# Patient Record
Sex: FEMALE | Race: BLACK OR AFRICAN AMERICAN | URBAN - METROPOLITAN AREA
[De-identification: names, ages, dates, MRNs, and addresses within clinical notes are randomized per-mention and may not be internally consistent; named-entity substitution may affect disease eponyms.]

---

## 2017-10-25 ENCOUNTER — OFFICE VISIT (OUTPATIENT)
Dept: PRIMARY CARE CLINIC | Age: 32
End: 2017-10-25

## 2017-10-25 VITALS
SYSTOLIC BLOOD PRESSURE: 106 MMHG | TEMPERATURE: 97.7 F | WEIGHT: 175.9 LBS | DIASTOLIC BLOOD PRESSURE: 66 MMHG | OXYGEN SATURATION: 99 % | HEIGHT: 68 IN | HEART RATE: 74 BPM | BODY MASS INDEX: 26.66 KG/M2

## 2017-10-25 DIAGNOSIS — Z12.4 CERVICAL CANCER SCREENING: ICD-10-CM

## 2017-10-25 DIAGNOSIS — Z00.00 PREVENTATIVE HEALTH CARE: Primary | ICD-10-CM

## 2017-10-25 DIAGNOSIS — Z23 NEED FOR TDAP VACCINATION: ICD-10-CM

## 2017-10-25 DIAGNOSIS — Z00.00 PREVENTATIVE HEALTH CARE: ICD-10-CM

## 2017-10-25 PROBLEM — S92.511A CLOSED DISPLACED FRACTURE OF PROXIMAL PHALANX OF LESSER TOE OF RIGHT FOOT: Status: ACTIVE | Noted: 2017-10-14

## 2017-10-25 LAB
ALBUMIN SERPL-MCNC: 4.2 G/DL (ref 3.4–5)
ALP BLD-CCNC: 40 U/L (ref 40–129)
ALT SERPL-CCNC: 6 U/L (ref 10–40)
ANION GAP SERPL CALCULATED.3IONS-SCNC: 12 MMOL/L (ref 3–16)
AST SERPL-CCNC: 12 U/L (ref 15–37)
BASOPHILS ABSOLUTE: 0 K/UL (ref 0–0.2)
BASOPHILS RELATIVE PERCENT: 0.2 %
BILIRUB SERPL-MCNC: 0.8 MG/DL (ref 0–1)
BILIRUBIN DIRECT: <0.2 MG/DL (ref 0–0.3)
BILIRUBIN, INDIRECT: ABNORMAL MG/DL (ref 0–1)
BUN BLDV-MCNC: 12 MG/DL (ref 7–20)
CALCIUM SERPL-MCNC: 9.4 MG/DL (ref 8.3–10.6)
CHLORIDE BLD-SCNC: 103 MMOL/L (ref 99–110)
CO2: 26 MMOL/L (ref 21–32)
CREAT SERPL-MCNC: 0.8 MG/DL (ref 0.6–1.1)
EOSINOPHILS ABSOLUTE: 0.1 K/UL (ref 0–0.6)
EOSINOPHILS RELATIVE PERCENT: 0.7 %
GFR AFRICAN AMERICAN: >60
GFR NON-AFRICAN AMERICAN: >60
GLUCOSE BLD-MCNC: 72 MG/DL (ref 70–99)
HCT VFR BLD CALC: 41 % (ref 36–48)
HEMOGLOBIN: 13.9 G/DL (ref 12–16)
HEPATITIS C ANTIBODY INTERPRETATION: NORMAL
LYMPHOCYTES ABSOLUTE: 1.3 K/UL (ref 1–5.1)
LYMPHOCYTES RELATIVE PERCENT: 17 %
MCH RBC QN AUTO: 30.6 PG (ref 26–34)
MCHC RBC AUTO-ENTMCNC: 33.9 G/DL (ref 31–36)
MCV RBC AUTO: 90.1 FL (ref 80–100)
MONOCYTES ABSOLUTE: 0.6 K/UL (ref 0–1.3)
MONOCYTES RELATIVE PERCENT: 7.4 %
NEUTROPHILS ABSOLUTE: 5.8 K/UL (ref 1.7–7.7)
NEUTROPHILS RELATIVE PERCENT: 74.7 %
PDW BLD-RTO: 13.5 % (ref 12.4–15.4)
PHOSPHORUS: 3.8 MG/DL (ref 2.5–4.9)
PLATELET # BLD: 274 K/UL (ref 135–450)
PMV BLD AUTO: 8.5 FL (ref 5–10.5)
POTASSIUM SERPL-SCNC: 4.2 MMOL/L (ref 3.5–5.1)
RBC # BLD: 4.55 M/UL (ref 4–5.2)
SODIUM BLD-SCNC: 141 MMOL/L (ref 136–145)
TOTAL PROTEIN: 7 G/DL (ref 6.4–8.2)
TSH REFLEX: 2.94 UIU/ML (ref 0.27–4.2)
WBC # BLD: 7.8 K/UL (ref 4–11)

## 2017-10-25 PROCEDURE — 90715 TDAP VACCINE 7 YRS/> IM: CPT | Performed by: INTERNAL MEDICINE

## 2017-10-25 PROCEDURE — 99385 PREV VISIT NEW AGE 18-39: CPT | Performed by: INTERNAL MEDICINE

## 2017-10-25 PROCEDURE — 90471 IMMUNIZATION ADMIN: CPT | Performed by: INTERNAL MEDICINE

## 2017-10-25 ASSESSMENT — ENCOUNTER SYMPTOMS
CONSTIPATION: 0
COUGH: 0
NAUSEA: 0
ABDOMINAL PAIN: 0
DIARRHEA: 0
SHORTNESS OF BREATH: 0

## 2017-10-25 ASSESSMENT — PATIENT HEALTH QUESTIONNAIRE - PHQ9
SUM OF ALL RESPONSES TO PHQ9 QUESTIONS 1 & 2: 0
SUM OF ALL RESPONSES TO PHQ QUESTIONS 1-9: 0
2. FEELING DOWN, DEPRESSED OR HOPELESS: 0
1. LITTLE INTEREST OR PLEASURE IN DOING THINGS: 0

## 2017-10-25 NOTE — PROGRESS NOTES
After obtaining consent, and per orders of Dr. Zach Constantino, injection of tdap vaccine given in left arm by Violet Koenig. Patient instructed to remain in clinic for 20 minutes afterwards, and to report any adverse reaction to me immediately.

## 2017-10-25 NOTE — PATIENT INSTRUCTIONS
Patient Education        Well Visit, Ages 25 to 48: Care Instructions  Your Care Instructions  Physical exams can help you stay healthy. Your doctor has checked your overall health and may have suggested ways to take good care of yourself. He or she also may have recommended tests. At home, you can help prevent illness with healthy eating, regular exercise, and other steps. Follow-up care is a key part of your treatment and safety. Be sure to make and go to all appointments, and call your doctor if you are having problems. It's also a good idea to know your test results and keep a list of the medicines you take. How can you care for yourself at home? · Reach and stay at a healthy weight. This will lower your risk for many problems, such as obesity, diabetes, heart disease, and high blood pressure. · Get at least 30 minutes of physical activity on most days of the week. Walking is a good choice. You also may want to do other activities, such as running, swimming, cycling, or playing tennis or team sports. Discuss any changes in your exercise program with your doctor. · Do not smoke or allow others to smoke around you. If you need help quitting, talk to your doctor about stop-smoking programs and medicines. These can increase your chances of quitting for good. · Talk to your doctor about whether you have any risk factors for sexually transmitted infections (STIs). Having one sex partner (who does not have STIs and does not have sex with anyone else) is a good way to avoid these infections. · Use birth control if you do not want to have children at this time. Talk with your doctor about the choices available and what might be best for you. · Protect your skin from too much sun. When you're outdoors from 10 a.m. to 4 p.m., stay in the shade or cover up with clothing and a hat with a wide brim. Wear sunglasses that block UV rays.  Even when it's cloudy, put broad-spectrum sunscreen (SPF 30 or higher) on any

## 2017-10-26 LAB
ESTIMATED AVERAGE GLUCOSE: 105.4 MG/DL
HBA1C MFR BLD: 5.3 %
HIV-1 AND HIV-2 ANTIBODIES: NORMAL

## 2020-11-20 ENCOUNTER — HOSPITAL ENCOUNTER (EMERGENCY)
Age: 35
Discharge: HOME | End: 2020-11-20
Attending: EMERGENCY MEDICINE
Payer: MEDICARE

## 2020-11-20 VITALS
HEART RATE: 86 BPM | TEMPERATURE: 98.6 F | WEIGHT: 180.56 LBS | OXYGEN SATURATION: 100 % | BODY MASS INDEX: 27.36 KG/M2 | SYSTOLIC BLOOD PRESSURE: 112 MMHG | HEIGHT: 68 IN | RESPIRATION RATE: 17 BRPM | DIASTOLIC BLOOD PRESSURE: 77 MMHG

## 2020-11-20 PROCEDURE — 99283 EMERGENCY DEPT VISIT LOW MDM: CPT

## 2020-11-20 PROCEDURE — 93005 ELECTROCARDIOGRAM TRACING: CPT | Performed by: EMERGENCY MEDICINE

## 2020-11-20 RX ORDER — BENZONATATE 100 MG/1
100 CAPSULE ORAL 3 TIMES DAILY PRN
Qty: 30 CAPSULE | Refills: 0 | Status: SHIPPED | OUTPATIENT
Start: 2020-11-20 | End: 2020-11-27

## 2020-11-20 RX ORDER — IBUPROFEN 400 MG/1
400 TABLET ORAL EVERY 6 HOURS PRN
Qty: 30 TABLET | Refills: 0 | Status: SHIPPED | OUTPATIENT
Start: 2020-11-20

## 2020-11-20 ASSESSMENT — PAIN DESCRIPTION - DESCRIPTORS
DESCRIPTORS: HEADACHE
DESCRIPTORS: NAGGING

## 2020-11-20 ASSESSMENT — PAIN SCALES - GENERAL
PAINLEVEL_OUTOF10: 8
PAINLEVEL_OUTOF10: 8

## 2020-11-20 ASSESSMENT — PAIN DESCRIPTION - ONSET
ONSET: GRADUAL
ONSET: GRADUAL

## 2020-11-20 ASSESSMENT — PAIN DESCRIPTION - ORIENTATION
ORIENTATION: ANTERIOR
ORIENTATION: ANTERIOR

## 2020-11-20 ASSESSMENT — PAIN DESCRIPTION - LOCATION
LOCATION: HEAD;GENERALIZED
LOCATION: HEAD;GENERALIZED

## 2020-11-20 ASSESSMENT — PAIN DESCRIPTION - FREQUENCY
FREQUENCY: CONTINUOUS
FREQUENCY: CONTINUOUS

## 2020-11-20 ASSESSMENT — PAIN DESCRIPTION - PAIN TYPE
TYPE: ACUTE PAIN
TYPE: ACUTE PAIN

## 2020-11-20 ASSESSMENT — PAIN - FUNCTIONAL ASSESSMENT: PAIN_FUNCTIONAL_ASSESSMENT: 0-10

## 2020-11-20 ASSESSMENT — PAIN DESCRIPTION - PROGRESSION
CLINICAL_PROGRESSION: GRADUALLY WORSENING
CLINICAL_PROGRESSION: NOT CHANGED

## 2020-11-20 NOTE — ED NOTES
Discharge and education instructions reviewed. Patient verbalized understanding, teach-back successful. Patient denied questions at this time. No acute distress noted. Patient instructed to follow-up as noted - return to emergency department if symptoms worsen. Patient verbalized understanding. Discharged per EDMD with discharge instructions.         Jadon Gibbs RN  11/20/20 2063

## 2020-11-20 NOTE — ED PROVIDER NOTES
Emergency Physician Note    Chief Complaint  Concern For COVID-19 (symptoms started on Monday. pt states she was tested on tues. states her results are not back yet. pain 8/10. Symptoms: body aches, diarrhea, headache, loss of taste and smell )       History of Present Illness  Kyung Patel is a 28 y.o. female who presents to the ED for URI symptoms. Patient reports that she lost her sense of taste and smell and has some nasal congestion and diarrhea and headache. She was tested for COVID-19 couple days ago but does not have the results yet. She denies any fevers or shortness of breath but has had some episodes of chest pain that she wanted to have evaluated. She denies all cardiac or PE risk factors. She does not currently have any chest pain. 10 systems reviewed, pertinent positives per HPI otherwise noted to be negative    I have reviewed the following from the nursing documentation:      Prior to Admission medications    Not on File       Allergies as of 11/20/2020    (No Known Allergies)       History reviewed. No pertinent past medical history. Surgical History: History reviewed. No pertinent surgical history. Family History:  History reviewed. No pertinent family history.     Social History     Socioeconomic History    Marital status: Single     Spouse name: Not on file    Number of children: Not on file    Years of education: Not on file    Highest education level: Not on file   Occupational History    Not on file   Social Needs    Financial resource strain: Not on file    Food insecurity     Worry: Not on file     Inability: Not on file    Transportation needs     Medical: Not on file     Non-medical: Not on file   Tobacco Use    Smoking status: Never Smoker    Smokeless tobacco: Never Used   Substance and Sexual Activity    Alcohol use: No    Drug use: Not on file    Sexual activity: Yes   Lifestyle    Physical activity     Days per week: Not on file     Minutes per session: Not on file    Stress: Not on file   Relationships    Social connections     Talks on phone: Not on file     Gets together: Not on file     Attends Taoism service: Not on file     Active member of club or organization: Not on file     Attends meetings of clubs or organizations: Not on file     Relationship status: Not on file    Intimate partner violence     Fear of current or ex partner: Not on file     Emotionally abused: Not on file     Physically abused: Not on file     Forced sexual activity: Not on file   Other Topics Concern    Not on file   Social History Narrative    Not on file       Nursing notes reviewed. ED Triage Vitals [11/20/20 1010]   Enc Vitals Group      /77      Pulse 86      Resp 17      Temp 98.6 °F (37 °C)      Temp Source Infrared      SpO2 100 %      Weight 180 lb 8.9 oz (81.9 kg)      Height 5' 8\" (1.727 m)      Head Circumference       Peak Flow       Pain Score       Pain Loc       Pain Edu? Excl. in [address]? GENERAL:  Awake, alert. Well developed, well nourished with no apparent distress. HENT:  Normocephalic, Atraumatic, moist mucous membranes. EYES:  Pupils equal round and reactive to light, Conjunctiva normal, extraocular movements normal.  NECK:  No meningeal signs, Supple. CHEST:  Regular rate and rhythm, chest wall non-tender. LUNGS:  Clear to auscultation bilaterally. ABDOMEN:  Soft, non-tender, no rebound, rigidity or guarding, non-distended, normal bowel sounds. No costovertebral angle tenderness to palpation. BACK:  No tenderness. EXTREMITIES:  Normal range of motion, no edema, no bony tenderness, no deformity, distal pulses present. SKIN: Warm, dry and intact. NEUROLOGIC: Normal mental status. Moving all extremities to command. LABS and DIAGNOSTIC RESULTS  EKG  The Ekg interpreted by me shows  normal sinus rhythm with a rate of 72  Axis is   Normal  QTc is  normal  Intervals and Durations are unremarkable.       ST Segments: normal  Delta waves, Brugada Syndrome, and Short IA are not present. No prior EKG available for comparison. MEDICAL DECISION MAKING     I advised the patient to return to the emergency department immediately for any new or worsening symptoms, such as fever, shortness of breath or worsened chest pain. .   The patient voiced agreement and understanding of the treatment plan. No results found for this visit on 11/20/20. I estimate there is LOW risk for PULMONARY EMBOLISM, ACUTE CORONARY SYNDROME, OR THORACIC AORTIC DISSECTION, EPIGLOTTITIS, PNEUMONIA, MENINGITIS, OR URINARY TRACT INFECTION, thus I consider the discharge disposition reasonable. Also, there is no evidence or peritonitis, sepsis, or toxicity. [facility] and I have discussed the diagnosis and risks, and we agree with discharging home to follow-up with their primary doctor. We also discussed returning to the Emergency Department immediately if new or worsening symptoms occur. We have discussed the symptoms which are most concerning (e.g., changing or worsening pain, trouble swallowing or breating, neck stiffness, fever) that necessitate immediate return. Final Impression    1. Viral URI    2. Chest pain, unspecified type        Discharge Vital Signs:  Blood pressure 112/77, pulse 86, temperature 98.6 °F (37 °C), temperature source Infrared, resp. rate 17, height 5' 8\" (1.727 m), weight 180 lb 8.9 oz (81.9 kg), SpO2 100 %, not currently breastfeeding. Patient was given scripts for the following medications. I counseled patient how to take these medications.   Discharge Medication List as of 11/20/2020 11:22 AM      START taking these medications    Details   benzonatate (TESSALON PERLES) 100 MG capsule Take 1 capsule by mouth 3 times daily as needed for Cough, Disp-30 capsule,R-0Print      ibuprofen (ADVIL;MOTRIN) 400 MG tablet Take 1 tablet by mouth every 6 hours as needed for Pain, Disp-30 tablet,R-0Print Disposition  Pt is in good condition upon Discharge to home. This chart was generated using the [address] [place] [address] dictation system. I created this record but it may contain dictation errors.           Karan Fitzpatrick MD  11/20/20 5920

## 2020-11-21 LAB
EKG ATRIAL RATE: 72 BPM
EKG DIAGNOSIS: NORMAL
EKG P AXIS: 32 DEGREES
EKG P-R INTERVAL: 160 MS
EKG Q-T INTERVAL: 374 MS
EKG QRS DURATION: 72 MS
EKG QTC CALCULATION (BAZETT): 409 MS
EKG R AXIS: 26 DEGREES
EKG T AXIS: 28 DEGREES
EKG VENTRICULAR RATE: 72 BPM

## 2020-11-21 PROCEDURE — 93010 ELECTROCARDIOGRAM REPORT: CPT | Performed by: INTERNAL MEDICINE

## 2021-03-16 ENCOUNTER — NURSE TRIAGE (OUTPATIENT)
Dept: OTHER | Facility: CLINIC | Age: 36
End: 2021-03-16

## 2021-03-16 NOTE — TELEPHONE ENCOUNTER
Patient called Ross Lepe at [facility])  with red flag complaint. Brief description of triage: Tingling in both legs, stated in September, 2020. But in the last 3-4 months it has been all the time. It makes it difficult to sleep. Thighs especially but entire legs tingling. No back pain or problems. Had covid in November but didn't have chest pain until then. Chest pain since then off and on. Has not seen anyone about the chest pain issue. Covid symptoms at the time of diagnosis were severe headache and chest pain. Triage indicates for patient to see patient within the next 3 days     Care advice provided, patient verbalizes understanding; denies any other questions or concerns; instructed to call back for any new or worsening symptoms. Writer provided warm transfer to Elisa Ortega at [facility], [place], operated by [facility] for appointment scheduling. Attention Provider: Thank you for allowing me to participate in the care of your patient. The patient was connected to triage in response to information provided to the ECC. Please do not respond through this encounter as the response is not directed to a shared pool. Reason for Disposition   [1] Numbness or tingling in one or both feet AND [2] is a chronic symptom (recurrent or ongoing AND present > 4 weeks)    Answer Assessment - Initial Assessment Questions  1. SYMPTOM: \"What is the main symptom you are concerned about? \" (e.g., weakness, numbness)      Tingling in legs  2. ONSET: \"When did this start? \" (minutes, hours, days; while sleeping)      Sept. 2020  3. LAST NORMAL: \"When was the last time you were normal (no symptoms)? \"      It was gradual but then it started all the time. 4. PATTERN \"Does this come and go, or has it been constant since it started? \"  \"Is it present now? \"      Constant now. 5. CARDIAC SYMPTOMS: \"Have you had any of the following symptoms: chest pain, difficulty breathing, palpitations? \"      Chest pain since she had Covid in

## 2022-01-20 ENCOUNTER — OFFICE VISIT (OUTPATIENT)
Dept: PRIMARY CARE CLINIC | Age: 37
End: 2022-01-20
Payer: MEDICARE

## 2022-01-20 DIAGNOSIS — R68.83 CHILLS: ICD-10-CM

## 2022-01-20 DIAGNOSIS — R11.2 INTRACTABLE VOMITING WITH NAUSEA, UNSPECIFIED VOMITING TYPE: ICD-10-CM

## 2022-01-20 DIAGNOSIS — R53.83 FATIGUE, UNSPECIFIED TYPE: ICD-10-CM

## 2022-01-20 DIAGNOSIS — J34.89 RHINORRHEA: ICD-10-CM

## 2022-01-20 DIAGNOSIS — R51.9 ACUTE INTRACTABLE HEADACHE, UNSPECIFIED HEADACHE TYPE: ICD-10-CM

## 2022-01-20 DIAGNOSIS — Z20.822 CLOSE EXPOSURE TO COVID-19 VIRUS: Primary | ICD-10-CM

## 2022-01-20 DIAGNOSIS — R19.7 DIARRHEA, UNSPECIFIED TYPE: ICD-10-CM

## 2022-01-20 LAB — SARS-COV-2: NOT DETECTED

## 2022-01-20 PROCEDURE — G8484 FLU IMMUNIZE NO ADMIN: HCPCS | Performed by: NURSE PRACTITIONER

## 2022-01-20 PROCEDURE — G8428 CUR MEDS NOT DOCUMENT: HCPCS | Performed by: NURSE PRACTITIONER

## 2022-01-20 PROCEDURE — 99202 OFFICE O/P NEW SF 15 MIN: CPT | Performed by: NURSE PRACTITIONER

## 2022-01-20 PROCEDURE — 1036F TOBACCO NON-USER: CPT | Performed by: NURSE PRACTITIONER

## 2022-01-20 PROCEDURE — G8421 BMI NOT CALCULATED: HCPCS | Performed by: NURSE PRACTITIONER

## 2022-01-20 ASSESSMENT — ENCOUNTER SYMPTOMS
NAUSEA: 1
VISUAL CHANGE: 0
ABDOMINAL PAIN: 0
CHANGE IN BOWEL HABIT: 1
SORE THROAT: 0
COUGH: 0
VOMITING: 1
SWOLLEN GLANDS: 0

## 2022-01-20 NOTE — PROGRESS NOTES
Lamont Rush (:  1985) is a 39 y.o. female,New patient, here for evaluation of the following chief complaint(s):  No chief complaint on file. ASSESSMENT/PLAN:  1. Close exposure to COVID-19 virus  -     COVID-19  2. Chills  -     COVID-19  3. Fatigue, unspecified type  -     COVID-19  4. Rhinorrhea  -     COVID-19  5. Acute intractable headache, unspecified headache type  -     COVID-19  6. Intractable vomiting with nausea, unspecified vomiting type  -     COVID-19  7. Diarrhea, unspecified type  -     COVID-19      No follow-ups on file. Subjective   SUBJECTIVE/OBJECTIVE:  Car side visit  Exposure to covid positive case  She has chills, fatigue, runny nose, headache, nausea, vomiting and diarrhea for 2-3 days  She has had the covid vaccine times 2    Fatigue  This is a new problem. The current episode started in the past 7 days. The problem occurs constantly. The problem has been unchanged. Associated symptoms include a change in bowel habit, chills, fatigue, headaches, nausea and vomiting. Pertinent negatives include no abdominal pain, anorexia, arthralgias, chest pain, congestion, coughing, diaphoresis, fever, joint swelling, myalgias, neck pain, numbness, rash, sore throat, swollen glands, urinary symptoms, vertigo, visual change or weakness. Review of Systems   Constitutional: Positive for activity change, appetite change, chills and fatigue. Negative for diaphoresis and fever. HENT: Negative for congestion and sore throat. Respiratory: Negative for cough. Cardiovascular: Negative for chest pain. Gastrointestinal: Positive for change in bowel habit, nausea and vomiting. Negative for abdominal pain and anorexia. Musculoskeletal: Negative for arthralgias, joint swelling, myalgias and neck pain. Skin: Negative for rash. Neurological: Positive for headaches. Negative for vertigo, weakness and numbness.           Objective   Physical Exam  Constitutional: Appearance: She is ill-appearing. HENT:      Head: Normocephalic. Right Ear: External ear normal.      Left Ear: External ear normal.      Nose: Nose normal.      Mouth/Throat:      Mouth: Mucous membranes are moist.   Eyes:      Extraocular Movements: Extraocular movements intact. Conjunctiva/sclera: Conjunctivae normal.      Pupils: Pupils are equal, round, and reactive to light. Pulmonary:      Effort: Pulmonary effort is normal.   Musculoskeletal:         General: Normal range of motion. Cervical back: Normal range of motion. Skin:     General: Skin is warm. Neurological:      General: No focal deficit present. Mental Status: She is alert and oriented to person, place, and time. An electronic signature was used to authenticate this note.     --Merline Cue, APRN - CNP

## 2022-01-28 ENCOUNTER — OFFICE VISIT (OUTPATIENT)
Dept: PRIMARY CARE CLINIC | Age: 37
End: 2022-01-28
Payer: MEDICARE

## 2022-01-28 VITALS
OXYGEN SATURATION: 99 % | RESPIRATION RATE: 18 BRPM | SYSTOLIC BLOOD PRESSURE: 101 MMHG | BODY MASS INDEX: 28.49 KG/M2 | WEIGHT: 188 LBS | HEIGHT: 68 IN | HEART RATE: 70 BPM | DIASTOLIC BLOOD PRESSURE: 67 MMHG | TEMPERATURE: 97 F

## 2022-01-28 DIAGNOSIS — R19.7 DIARRHEA, UNSPECIFIED TYPE: ICD-10-CM

## 2022-01-28 DIAGNOSIS — R51.9 NONINTRACTABLE EPISODIC HEADACHE, UNSPECIFIED HEADACHE TYPE: Primary | ICD-10-CM

## 2022-01-28 DIAGNOSIS — R06.83 SNORING: ICD-10-CM

## 2022-01-28 DIAGNOSIS — Z00.00 ROUTINE ADULT HEALTH MAINTENANCE: ICD-10-CM

## 2022-01-28 DIAGNOSIS — R07.89 ATYPICAL CHEST PAIN: ICD-10-CM

## 2022-01-28 DIAGNOSIS — R42 LIGHTHEADED: ICD-10-CM

## 2022-01-28 DIAGNOSIS — R10.811 RIGHT UPPER QUADRANT ABDOMINAL TENDERNESS WITHOUT REBOUND TENDERNESS: ICD-10-CM

## 2022-01-28 PROCEDURE — 99203 OFFICE O/P NEW LOW 30 MIN: CPT | Performed by: INTERNAL MEDICINE

## 2022-01-28 PROCEDURE — 1036F TOBACCO NON-USER: CPT | Performed by: INTERNAL MEDICINE

## 2022-01-28 PROCEDURE — G8427 DOCREV CUR MEDS BY ELIG CLIN: HCPCS | Performed by: INTERNAL MEDICINE

## 2022-01-28 PROCEDURE — G8484 FLU IMMUNIZE NO ADMIN: HCPCS | Performed by: INTERNAL MEDICINE

## 2022-01-28 PROCEDURE — G8419 CALC BMI OUT NRM PARAM NOF/U: HCPCS | Performed by: INTERNAL MEDICINE

## 2022-01-28 RX ORDER — MELOXICAM 15 MG/1
15 TABLET ORAL DAILY PRN
Qty: 10 TABLET | Refills: 3 | Status: SHIPPED | OUTPATIENT
Start: 2022-01-28

## 2022-01-28 RX ORDER — BUSPIRONE HYDROCHLORIDE 5 MG/1
1 TABLET ORAL 2 TIMES DAILY
COMMUNITY
Start: 2021-12-14

## 2022-01-28 RX ORDER — PANTOPRAZOLE SODIUM 20 MG/1
20 TABLET, DELAYED RELEASE ORAL DAILY
Qty: 30 TABLET | Refills: 3 | Status: SHIPPED | OUTPATIENT
Start: 2022-01-28 | End: 2022-02-07 | Stop reason: SDUPTHER

## 2022-01-28 SDOH — ECONOMIC STABILITY: FOOD INSECURITY: WITHIN THE PAST 12 MONTHS, YOU WORRIED THAT YOUR FOOD WOULD RUN OUT BEFORE YOU GOT MONEY TO BUY MORE.: NEVER TRUE

## 2022-01-28 SDOH — ECONOMIC STABILITY: FOOD INSECURITY: WITHIN THE PAST 12 MONTHS, THE FOOD YOU BOUGHT JUST DIDN'T LAST AND YOU DIDN'T HAVE MONEY TO GET MORE.: NEVER TRUE

## 2022-01-28 ASSESSMENT — PATIENT HEALTH QUESTIONNAIRE - PHQ9
1. LITTLE INTEREST OR PLEASURE IN DOING THINGS: 0
SUM OF ALL RESPONSES TO PHQ QUESTIONS 1-9: 0
2. FEELING DOWN, DEPRESSED OR HOPELESS: 0
SUM OF ALL RESPONSES TO PHQ9 QUESTIONS 1 & 2: 0
SUM OF ALL RESPONSES TO PHQ QUESTIONS 1-9: 0

## 2022-01-28 ASSESSMENT — SOCIAL DETERMINANTS OF HEALTH (SDOH): HOW HARD IS IT FOR YOU TO PAY FOR THE VERY BASICS LIKE FOOD, HOUSING, MEDICAL CARE, AND HEATING?: NOT HARD AT ALL

## 2022-01-28 NOTE — PATIENT INSTRUCTIONS
1. Mobic 15mg daily as needed for headache  2. Protonix 20mg daily in AM before breakfast  3. Metamucil every evening in a glass of water, purchase OTC  4. Fasting blood work. 5. Stool test.  6. Abdl ultrasound. 7. See me after tsts  8.  Orthostatics OK, no salt tabs needed

## 2022-01-28 NOTE — PROGRESS NOTES
2022    Lina Maravilla (:  1985) is a 39 y.o. female, here for evaluation of the following medical concerns:    Chief Complaint   Patient presents with    New Patient       HPI  78-year-old female with recent Covid exposure prompting office visit  for symptoms of chills fatigue runny nose headache nausea vomiting diarrhea, swab negative for Covid by rapid antigen. Symptoms subsided but she comes in for with other concerns:  #1 episodic headache. Since she had Covid in  with a very prominent frontal headache, she continues to experience rare, monthly headaches pounding bifrontal without nausea photo phonophobia aura family history migraine rhinorrhea sinus pressure pain, triggers uncertain. The headache can last several hours to all day. She prefers to lay down. She takes nothing for it. 2.  Atypical chest pain. Independent of her headache, she will on a monthly basis experience severe chest pain under her breastbone, lasting all day. Not triggered by food position exertion bowel movements. Nonradiating nonpleuritic no associated nausea. She takes nothing for it. She does endorse heartburn symptoms however. No solid food dysphagia or melena. 3.  Episodic diarrhea. She reports experiencing crampy abdominal pain relieved by loose mucousy stool couple times a week. It does not awaken her from sleep. No sick contacts recent antibiotics. She has normal bowel movements in between. She wonders if she has irritable bowel. (Note: Right upper quadrant and epigastric tenderness on exam)    Labs 2017 normal CBC CMP TSH A1c HIV HCV. No vitamin D or lipid panel. Tdap 2017 no other vaccinations on record though patient states that she has had 2 Covid shots. 2+ clue cells 2018. Review of Systems   Constitutional: Negative for activity change, appetite change, fatigue and unexpected weight change.    HENT: Negative for dental problem, sinus pain, sore throat and trouble swallowing. Eyes: Negative for pain and visual disturbance. Respiratory: Negative for apnea, cough, chest tightness, shortness of breath and wheezing. Cardiovascular: Negative for chest pain and palpitations. Gastrointestinal: Negative for abdominal pain, blood in stool, constipation, diarrhea, nausea, rectal pain and vomiting. Endocrine: Negative for cold intolerance, heat intolerance, polydipsia, polyphagia and polyuria. Genitourinary: Negative for difficulty urinating, dysuria, flank pain, frequency, hematuria, pelvic pain, urgency, vaginal bleeding and vaginal discharge. Musculoskeletal: Negative for arthralgias, back pain, gait problem, joint swelling, myalgias, neck pain and neck stiffness. Skin: Negative for color change and rash. Neurological: Negative for dizziness, tremors, syncope, speech difficulty, weakness, light-headedness and headaches. Hematological: Negative for adenopathy. Does not bruise/bleed easily. Psychiatric/Behavioral: Negative for agitation, behavioral problems, decreased concentration, sleep disturbance and suicidal ideas. The patient is not nervous/anxious and is not hyperactive. Prior to Visit Medications    Medication Sig Taking? Authorizing Provider   pantoprazole (PROTONIX) 20 MG tablet Take 1 tablet by mouth daily Yes Юлия Jon MD   meloxicam (MOBIC) 15 MG tablet Take 1 tablet by mouth daily as needed (headache) Yes Юлия Jon MD   ibuprofen (ADVIL;MOTRIN) 400 MG tablet Take 1 tablet by mouth every 6 hours as needed for Pain Yes Tsering Hughes MD   busPIRone (BUSPAR) 5 MG tablet Take 1 tablet by mouth 2 times daily  Historical Provider, MD        No Known Allergies    Past Medical History:   Diagnosis Date    COVID-19 11/20/2020       No past surgical history on file.     Social History     Socioeconomic History    Marital status: Single     Spouse name: Not on file    Number of children: Not on file    Years of education: Not on file    Highest education level: Not on file   Occupational History    Not on file   Tobacco Use    Smoking status: Never Smoker    Smokeless tobacco: Never Used   Substance and Sexual Activity    Alcohol use: No    Drug use: Not on file    Sexual activity: Yes   Other Topics Concern    Not on file   Social History Narrative    Not on file     Social Determinants of Health     Financial Resource Strain: Low Risk     Difficulty of Paying Living Expenses: Not hard at all   Food Insecurity: No Food Insecurity    Worried About [address] in the Last Year: Never true    [facility] in the Last Year: Never true   Transportation Needs:     Lack of Transportation (Medical): Not on file    Lack of Transportation (Non-Medical): Not on file   Physical Activity:     Days of Exercise per Week: Not on file    Minutes of Exercise per Session: Not on file   Stress:     Feeling of Stress : Not on file   Social Connections:     Frequency of Communication with Friends and Family: Not on file    Frequency of Social Gatherings with Friends and Family: Not on file    Attends Zoroastrianism Services: Not on file    Active Member of [address] [place] [address] or Organizations: Not on file    Attends Club or Organization Meetings: Not on file    Marital Status: Not on file   Intimate Partner Violence:     Fear of Current or Ex-Partner: Not on file    Emotionally Abused: Not on file    Physically Abused: Not on file    Sexually Abused: Not on file   Housing Stability:     Unable to Pay for Housing in the Last Year: Not on file    Number of [place] in the Last Year: Not on file    Unstable Housing in the Last Year: Not on file   Occasional alcohol no recreational drug use non-smoker lives at home with her 6year-old son. She works at [employer] as an [employer]. No family history on file.     Vitals:    01/28/22 1257 01/28/22 1340 01/28/22 1341 01/28/22 1344   BP: 93/63 93/65 89/61 101/67   Site: Left Upper Arm Left Upper Arm Left Upper Arm Left Upper Arm   Position: Sitting Sitting Standing Supine   Cuff Size: Large Adult Large Adult Large Adult Large Adult   Pulse: 76 75  70   Resp: 18      Temp: 97 °F (36.1 °C)      TempSrc: Temporal      SpO2: 99%      Weight: 188 lb (85.3 kg)      Height: 5' 8\" (1.727 m)        Estimated body mass index is 28.59 kg/m² as calculated from the following:    Height as of this encounter: 5' 8\" (1.727 m). Weight as of this encounter: 188 lb (85.3 kg). PHYSICAL EXAM  GENERAL:  Pleasant  female who looks her stated age, awake alert and oriented x3, no acute distress. HEENT:  Normocephalic atraumatic. Pupils equal round reactive light and accommodation, extra ocular muscles are intact. Oropharynx is clear moist without injection or exudate. Tongue and palate move normally. Turbinates appear normal.  Tympanic membranes appear normal.  No nystagmus. NECK:  Supple nontender. No carotid bruits. Brisk carotid upstrokes, no JVD. No thyromegaly. LYMPH:  No supraclavicular cervical axillary or inguinal lymphadenopathy. LUNGS:  Clear to auscultation bilaterally. Excellent air entry. No inspiratory crackles or expiratory wheezes. HEART:  Regular rate and rhythm without pathologic murmur rub gallop S3 or S4. Palpitation of the costochondral joints does not reproduce her chest pain. ABDOMEN:  Soft, right upper quadrant and epigastric tender. Normal bowel sounds. No guarding. No masses. UROGENITAL:  Deferred  EXTREMITIES:  Warm and well perfused without clubbing cyanosis or edema. 2+ pulses in all 4 extremities. Capillary refill less than 2 seconds. NEURO:  Cranial nerves 2-12 grossly intact. Normal muscle bulk and tone. No resting tremor, cogwheeling, normal rapid alternating movements in the hands and feet. No stocking paresthesia. Normal gait and station. MUSCULOSKELETAL:  Mild osteoarthritic changes.   SKIN:  No worrisome lesions, skin a little dry. PSYCH:  No psychomotor retardation or agitation. Good eye contact. Unrestricted affect range. Mood congruent with affect. Linear thought. LABS  Lab Review   Office Visit on 01/20/2022   Component Date Value    SARS-CoV-2 01/20/2022 Not Detected          ASSESSMENT/PLAN  1. Nonintractable episodic headache, unspecified headache type  In the absence of other neurologic findings and given the comparative rarity of her headaches, would not pursue screen for venous sinus thrombosis post COVID. Exam does not really suggest sinusitis TMJ dysfunction history does not suggest classic migraine. Treat with as needed Mobic.  - C-Reactive Protein; Future    2. Atypical chest pain  Broad differential.  We will screen for H. pylori, occult GI bleed, cervicitis, and treat for GERD with Protonix daily. We will also need for gallbladder disease by blood work and ultrasound. - C-Reactive Protein; Future    3. Snoring  Tight posterior oropharynx with 2+ tonsils history of witnessed sleep apnea such that she cannot sleep on her back prompting referral to sleep medicine. Possibly she needs to see ENT. - Nettie Sams MD, Sleep Medicine, [facility] [place]    4. Right upper quadrant abdominal tenderness without rebound tenderness  See #2 above. - C-Reactive Protein; Future  - Comprehensive Metabolic Panel; Future  - US GALLBLADDER RUQ; Future    5. Diarrhea, unspecified type  We will screen for potential causes, not classic malabsorption story, I cannot elicit a triggered by stress story; no recent travel or unusual foods to suggest infection, and again quite intermittent. Agree this may represent irritable bowel, trial of fiber supplementation.  - H. PYLORI ANTIGEN, STOOL; Future  - Iron and TIBC; Future  - POCT Fecal Immunochemical Test (FIT); Future  - HEMOGLOBIN; Future  - PREGNANCY, URINE; Future    6.  Routine adult health maintenance  Declines influenza not interested in Covid vaccination.  - Vitamin D 25 Hydroxy; Future  - TSH with Reflex; Future  - Lipid Panel; Future    7. Lightheaded  Patient is briefly lightheaded when she flexes at the waist and then straightens suddenly. She has not passed out. She is somewhat hypotensive but is so chronically. We will check for potential causes, orthostatics not overwhelming though unfortunately done sitting standing supine. Hold off on salt supplementation. Suggested that she squat rather than bending at the waist to get things off the floor.  - TSH with Reflex; Future  - CORTISOL AM; Future  - PREGNANCY, URINE; Future      Return in about 4 weeks (around 2/25/2022). It was a pleasure to visit with Ms. Renata Hahn today. Answered all questions as best I could.   Gilbert Lezama MD   Time 32 minutes

## 2022-01-29 ENCOUNTER — HOSPITAL ENCOUNTER (OUTPATIENT)
Age: 37
Discharge: HOME | End: 2022-01-29
Payer: MEDICARE

## 2022-01-29 ENCOUNTER — HOSPITAL ENCOUNTER (OUTPATIENT)
Dept: ULTRASOUND IMAGING | Age: 37
Discharge: HOME | End: 2022-01-29
Payer: MEDICARE

## 2022-01-29 DIAGNOSIS — R42 LIGHTHEADED: ICD-10-CM

## 2022-01-29 DIAGNOSIS — Z00.00 ROUTINE ADULT HEALTH MAINTENANCE: ICD-10-CM

## 2022-01-29 DIAGNOSIS — R51.9 NONINTRACTABLE EPISODIC HEADACHE, UNSPECIFIED HEADACHE TYPE: ICD-10-CM

## 2022-01-29 DIAGNOSIS — R07.89 ATYPICAL CHEST PAIN: ICD-10-CM

## 2022-01-29 DIAGNOSIS — R19.7 DIARRHEA, UNSPECIFIED TYPE: ICD-10-CM

## 2022-01-29 DIAGNOSIS — R10.811 RIGHT UPPER QUADRANT ABDOMINAL TENDERNESS WITHOUT REBOUND TENDERNESS: ICD-10-CM

## 2022-01-29 LAB
A/G RATIO: 1.5 (ref 1.1–2.2)
ALBUMIN SERPL-MCNC: 4.1 G/DL (ref 3.4–5)
ALP BLD-CCNC: 41 U/L (ref 40–129)
ALT SERPL-CCNC: 14 U/L (ref 10–40)
ANION GAP SERPL CALCULATED.3IONS-SCNC: 11 MMOL/L (ref 3–16)
AST SERPL-CCNC: 16 U/L (ref 15–37)
BILIRUB SERPL-MCNC: 0.5 MG/DL (ref 0–1)
BUN BLDV-MCNC: 15 MG/DL (ref 7–20)
C-REACTIVE PROTEIN: <3 MG/L (ref 0–5.1)
CALCIUM SERPL-MCNC: 9.2 MG/DL (ref 8.3–10.6)
CHLORIDE BLD-SCNC: 106 MMOL/L (ref 99–110)
CHOLESTEROL, TOTAL: 170 MG/DL (ref 0–199)
CO2: 24 MMOL/L (ref 21–32)
CORTISOL - AM: 12.1 UG/DL (ref 4.3–22.4)
CREAT SERPL-MCNC: 0.9 MG/DL (ref 0.6–1.1)
GFR AFRICAN AMERICAN: >60
GFR NON-AFRICAN AMERICAN: >60
GLUCOSE BLD-MCNC: 87 MG/DL (ref 70–99)
HCG(URINE) PREGNANCY TEST: NEGATIVE
HDLC SERPL-MCNC: 53 MG/DL (ref 40–60)
HEMOGLOBIN: 12.8 G/DL (ref 12–16)
IRON SATURATION: 42 % (ref 15–50)
IRON: 116 UG/DL (ref 37–145)
LDL CHOLESTEROL CALCULATED: 103 MG/DL
POTASSIUM SERPL-SCNC: 4 MMOL/L (ref 3.5–5.1)
SODIUM BLD-SCNC: 141 MMOL/L (ref 136–145)
TOTAL IRON BINDING CAPACITY: 274 UG/DL (ref 260–445)
TOTAL PROTEIN: 6.9 G/DL (ref 6.4–8.2)
TRIGL SERPL-MCNC: 71 MG/DL (ref 0–150)
TSH REFLEX: 2.01 UIU/ML (ref 0.27–4.2)
VITAMIN D 25-HYDROXY: 12.6 NG/ML
VLDLC SERPL CALC-MCNC: 14 MG/DL

## 2022-01-29 PROCEDURE — 86140 C-REACTIVE PROTEIN: CPT

## 2022-01-29 PROCEDURE — 84443 ASSAY THYROID STIM HORMONE: CPT

## 2022-01-29 PROCEDURE — 83540 ASSAY OF IRON: CPT

## 2022-01-29 PROCEDURE — 85018 HEMOGLOBIN: CPT

## 2022-01-29 PROCEDURE — 82306 VITAMIN D 25 HYDROXY: CPT

## 2022-01-29 PROCEDURE — 36415 COLL VENOUS BLD VENIPUNCTURE: CPT

## 2022-01-29 PROCEDURE — 80053 COMPREHEN METABOLIC PANEL: CPT

## 2022-01-29 PROCEDURE — 84703 CHORIONIC GONADOTROPIN ASSAY: CPT

## 2022-01-29 PROCEDURE — 82533 TOTAL CORTISOL: CPT

## 2022-01-29 PROCEDURE — 76705 ECHO EXAM OF ABDOMEN: CPT

## 2022-01-29 PROCEDURE — 83550 IRON BINDING TEST: CPT

## 2022-01-29 PROCEDURE — 80061 LIPID PANEL: CPT

## 2022-02-02 ENCOUNTER — OFFICE VISIT (OUTPATIENT)
Dept: PRIMARY CARE CLINIC | Age: 37
End: 2022-02-02
Payer: MEDICARE

## 2022-02-02 VITALS
TEMPERATURE: 98.7 F | DIASTOLIC BLOOD PRESSURE: 66 MMHG | OXYGEN SATURATION: 98 % | HEIGHT: 68 IN | HEART RATE: 87 BPM | WEIGHT: 188 LBS | BODY MASS INDEX: 28.49 KG/M2 | RESPIRATION RATE: 18 BRPM | SYSTOLIC BLOOD PRESSURE: 94 MMHG

## 2022-02-02 DIAGNOSIS — R19.7 DIARRHEA, UNSPECIFIED TYPE: ICD-10-CM

## 2022-02-02 DIAGNOSIS — Z02.0 SCHOOL PHYSICAL EXAM: ICD-10-CM

## 2022-02-02 DIAGNOSIS — Z23 NEED FOR INFLUENZA VACCINATION: Primary | ICD-10-CM

## 2022-02-02 LAB
REASON FOR REJECTION: NORMAL
REJECTED TEST: NORMAL

## 2022-02-02 PROCEDURE — G8419 CALC BMI OUT NRM PARAM NOF/U: HCPCS | Performed by: INTERNAL MEDICINE

## 2022-02-02 PROCEDURE — 99213 OFFICE O/P EST LOW 20 MIN: CPT | Performed by: INTERNAL MEDICINE

## 2022-02-02 PROCEDURE — G8484 FLU IMMUNIZE NO ADMIN: HCPCS | Performed by: INTERNAL MEDICINE

## 2022-02-02 PROCEDURE — G8427 DOCREV CUR MEDS BY ELIG CLIN: HCPCS | Performed by: INTERNAL MEDICINE

## 2022-02-02 PROCEDURE — 1036F TOBACCO NON-USER: CPT | Performed by: INTERNAL MEDICINE

## 2022-02-02 RX ORDER — ERGOCALCIFEROL 1.25 MG/1
50000 CAPSULE ORAL WEEKLY
Qty: 12 CAPSULE | Refills: 1 | Status: SHIPPED | OUTPATIENT
Start: 2022-02-02

## 2022-02-02 NOTE — LETTER
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 She will obtain flu shot at her local pharmacy. She will get a QuantiFERON blood test, results pending and will be made available to the patient. If you have any questions or concerns, please don't hesitate to call.     Sincerely,        Nicholas Banda MD

## 2022-02-02 NOTE — PATIENT INSTRUCTIONS
1. Gaviscon Advance for gas. Look for trigger foods contributing to gas  2. Stool specimens  3. Sleep study  4. Quantiferon blood test  5. Update me re h/a ctrl on mobic prn, and bloating.    6. Will send letter re stool tests

## 2022-02-02 NOTE — PROGRESS NOTES
2022    Vishnu Desai (:  1985) is a 39 y.o. female, here for evaluation of the following medical concerns:    Chief Complaint   Patient presents with    Employment Physical       HPI  63-year-old female who we saw her recently in the office for monthly headache that started after Covid in ; atypical chest pain, episodic diarrhea. She was noted to be lightheaded when she stands up too quickly, chronically runs blood pressure under 100/60. We organized appropriate investigations, asked her to take Metamucil Protonix; and Mobic as needed for headache. She comes in today to discuss results of our tests and interventions. Her chest pain and episodic diarrhea have improved. She has not had a headache requiring Mobic since we last saw her, this was a monthly event. Review of Systems   Constitutional: Negative for activity change, appetite change, fatigue and unexpected weight change. HENT: Negative for dental problem, sinus pain, sore throat and trouble swallowing. Eyes: Negative for pain and visual disturbance. Respiratory: Negative for apnea, cough, chest tightness, shortness of breath and wheezing. Cardiovascular: Negative for chest pain and palpitations. Gastrointestinal: Negative for abdominal pain, blood in stool, constipation, diarrhea, nausea, rectal pain and vomiting. Endocrine: Negative for cold intolerance, heat intolerance, polydipsia, polyphagia and polyuria. Genitourinary: Negative for difficulty urinating, dysuria, flank pain, frequency, hematuria, pelvic pain, urgency, vaginal bleeding and vaginal discharge. Musculoskeletal: Negative for arthralgias, back pain, gait problem, joint swelling, myalgias, neck pain and neck stiffness. Skin: Negative for color change and rash. Neurological: Negative for dizziness, tremors, syncope, speech difficulty, weakness, light-headedness and headaches. Hematological: Negative for adenopathy.  Does not bruise/bleed easily. Psychiatric/Behavioral: Negative for agitation, behavioral problems, decreased concentration, sleep disturbance and suicidal ideas. The patient is not nervous/anxious and is not hyperactive. Prior to Visit Medications    Medication Sig Taking? Authorizing Provider   vitamin D (ERGOCALCIFEROL) 1.25 MG (77393 UT) CAPS capsule Take 1 capsule by mouth once a week Yes James Lagos MD   busPIRone (BUSPAR) 5 MG tablet Take 1 tablet by mouth 2 times daily Yes Historical Provider, MD   pantoprazole (PROTONIX) 20 MG tablet Take 1 tablet by mouth daily Yes James Lagos MD   meloxicam (MOBIC) 15 MG tablet Take 1 tablet by mouth daily as needed (headache) Yes James Lagos MD   ibuprofen (ADVIL;MOTRIN) 400 MG tablet Take 1 tablet by mouth every 6 hours as needed for Pain Yes Robert Thornton MD        No Known Allergies    Past Medical History:   Diagnosis Date    COVID-19 11/20/2020       No past surgical history on file. Social History     Socioeconomic History    Marital status: Single     Spouse name: Not on file    Number of children: Not on file    Years of education: Not on file    Highest education level: Not on file   Occupational History    Not on file   Tobacco Use    Smoking status: Never Smoker    Smokeless tobacco: Never Used   Substance and Sexual Activity    Alcohol use: No    Drug use: Not on file    Sexual activity: Yes   Other Topics Concern    Not on file   Social History Narrative    Not on file     Social Determinants of Health     Financial Resource Strain: Low Risk     Difficulty of Paying Living Expenses: Not hard at all   Food Insecurity: No Food Insecurity    Worried About Running Out of Food in the Last Year: Never true    [address] in the Last Year: Never true   Transportation Needs:     Lack of Transportation (Medical): Not on file    Lack of Transportation (Non-Medical):  Not on file   Physical Activity:     Days of Exercise per Week: Not on file    Minutes of Exercise per Session: Not on file   Stress:     Feeling of Stress : Not on file   Social Connections:     Frequency of Communication with Friends and Family: Not on file    Frequency of Social Gatherings with Friends and Family: Not on file    Attends Shinto Services: Not on file    Active Member of [address] [place] [address] [employer] or Organizations: Not on file    Attends Club or Organization Meetings: Not on file    Marital Status: Not on file   Intimate Partner Violence:     Fear of Current or Ex-Partner: Not on file    Emotionally Abused: Not on file    Physically Abused: Not on file    Sexually Abused: Not on file   Housing Stability:     Unable to Pay for Housing in the Last Year: Not on file    Number of [place] in the Last Year: Not on file    Unstable Housing in the Last Year: Not on file   Occasional alcohol no recreational drug use non-smoker lives at home with her 6year-old son. She works at [employer] as an [employer]. No family history on file. Vitals:    02/02/22 1038   BP: 94/66   Site: Right Upper Arm   Position: Sitting   Cuff Size: Large Adult   Pulse: 87   Resp: 18   Temp: 98.7 °F (37.1 °C)   TempSrc: Temporal   SpO2: 98%   Weight: 188 lb (85.3 kg)   Height: 5' 8\" (1.727 m)     Estimated body mass index is 28.59 kg/m² as calculated from the following:    Height as of this encounter: 5' 8\" (1.727 m). Weight as of this encounter: 188 lb (85.3 kg). PHYSICAL EXAM  GENERAL:  Pleasant  female who looks her stated age, awake alert and oriented x3, no acute distress. HEENT:  Normocephalic atraumatic. Pupils equal round reactive light and accommodation, extra ocular muscles are intact. Oropharynx is clear moist without injection or exudate. Tongue and palate move normally. Turbinates appear normal.  Tympanic membranes appear normal.  No nystagmus. NECK:  Supple nontender. No carotid bruits.   Brisk carotid upstrokes, no JVD.  No thyromegaly. LYMPH:  No supraclavicular cervical axillary or inguinal lymphadenopathy. LUNGS:  Clear to auscultation bilaterally. Excellent air entry. No inspiratory crackles or expiratory wheezes. HEART:  Regular rate and rhythm without pathologic murmur rub gallop S3 or S4. Palpitation of the costochondral joints does not reproduce her chest pain. ABDOMEN:  Soft, right upper quadrant and epigastric less tender. Normal bowel sounds. No guarding. No masses. UROGENITAL:  Deferred  EXTREMITIES:  Warm and well perfused without clubbing cyanosis or edema. 2+ pulses in all 4 extremities. Capillary refill less than 2 seconds. NEURO:  Cranial nerves 2-12 grossly intact. Normal muscle bulk and tone. No resting tremor, cogwheeling, normal rapid alternating movements in the hands and feet. No stocking paresthesia. Normal gait and station. MUSCULOSKELETAL:  Mild osteoarthritic changes. SKIN:  No worrisome lesions, skin a little dry. PSYCH:  No psychomotor retardation or agitation. Good eye contact. Unrestricted affect range. Mood congruent with affect. Linear thought.     [id number] [facility] Outpatient Visit on 01/29/2022   Component Date Value    CRP 01/29/2022 <3.0     Sodium 01/29/2022 141     Potassium 01/29/2022 4.0     Chloride 01/29/2022 106     CO2 01/29/2022 24     Anion Gap 01/29/2022 11     Glucose 01/29/2022 87     BUN 01/29/2022 15     CREATININE 01/29/2022 0.9     GFR Non- 01/29/2022 >60     GFR  01/29/2022 >60     Calcium 01/29/2022 9.2     Total Protein 01/29/2022 6.9     Albumin 01/29/2022 4.1     Albumin/Globulin Ratio 01/29/2022 1.5     Total Bilirubin 01/29/2022 0.5     Alkaline Phosphatase 01/29/2022 41     ALT 01/29/2022 14     AST 01/29/2022 16     Vit D, 25-Hydroxy 01/29/2022 12.6*    TSH 01/29/2022 2.01     Cholesterol, Total 01/29/2022 170     Triglycerides 01/29/2022 71     HDL 01/29/2022 53     LDL Calculated 01/29/2022 103*    VLDL Cholesterol Calcula* 01/29/2022 14     Iron 01/29/2022 116     TIBC 01/29/2022 274     Iron Saturation 01/29/2022 42     Cortisol - AM 01/29/2022 12.1     Hemoglobin 01/29/2022 12.8     HCG(Urine) Pregnancy Test 01/29/2022 Negative    Office Visit on 01/20/2022   Component Date Value    SARS-CoV-2 01/20/2022 Not Detected          ASSESSMENT/PLAN  1. Nonintractable episodic headache, unspecified headache type  In the absence of other neurologic findings and given the comparative rarity of her headaches, would not pursue screen for venous sinus thrombosis post COVID. Exam does not really suggest sinusitis TMJ dysfunction history does not suggest classic migraine. CRP normal.  Treat with as needed Mobic. 2. Atypical chest pain attributable to GERD  Right Upper quadrant ultrasound LFTs noncontributory. Improved on PPI. She is still having some bloating for which Gaviscon advance suggested. Hemoccult, H. pylori fecal antigen ordered but evidently difficult to obtain due to improper instructions given to patient regarding collection storage etc.     3. Snoring  Tight posterior oropharynx with 2+ tonsils history of witnessed sleep apnea such that she cannot sleep on her back prompting referral to sleep medicine. Possibly she needs to see ENT. - Layne James MD, Sleep Medicine, [facility]    4. Probable irritable bowel syndrome. Nice  response to fiber. 6. Routine adult health maintenance  Tdap 2017, Influenza 2022. Not interested in Covid vaccination. Reviewed hospitalization, survival data vaccinated versus unvaccinated Americans. Screening tests 2021 - for diabetes dyslipidemia diabetes, however substantial vit D deficiency prescribed repletion. 7. Lightheaded  Patient is briefly lightheaded when she flexes at the waist and then straightens suddenly. She has not passed out. She is somewhat hypotensive but is so chronically.   Screened for pregnancy, cortisol insufficiency, thyroid disease, and anemia, all came back normal/negative. Should symptoms progress would suggest ensuring adequate hydration, progressing to sodium supplementation if needed    8. Work physical.  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 Encouraged her to reconsider Covid vaccination, she is precontemplative. We will screen for prior and active tuberculosis exposure with QuantiFERON testing. Evidently proof of immunity to hepatitis B is not a requirement in this job. Has received influenza vaccination this year, and is up-to-date with Tdap. Currently there is no contraindication to her current employment by history and exam at this time. Letter provided. No follow-ups on file. It was a pleasure to visit with MsAta Tavares Diego today. Answered all questions as best I could.   eKeley Moon MD   Time 28 minutes

## 2022-02-05 LAB
QUANTI TB GOLD PLUS: NEGATIVE
QUANTI TB1 MINUS NIL: 0 IU/ML (ref 0–0.34)
QUANTI TB2 MINUS NIL: 0 IU/ML (ref 0–0.34)
QUANTIFERON MITOGEN: 9.63 IU/ML
QUANTIFERON NIL: 0.08 IU/ML

## 2022-02-07 ENCOUNTER — TELEPHONE (OUTPATIENT)
Dept: PRIMARY CARE CLINIC | Age: 37
End: 2022-02-07

## 2022-02-07 LAB — H PYLORI ANTIGEN STOOL: POSITIVE

## 2022-02-07 RX ORDER — PANTOPRAZOLE SODIUM 40 MG/1
40 TABLET, DELAYED RELEASE ORAL 2 TIMES DAILY
Qty: 28 TABLET | Refills: 0 | Status: SHIPPED | OUTPATIENT
Start: 2022-02-07 | End: 2022-03-23

## 2022-02-07 RX ORDER — BISMUTH SUBSALICYLATE 262 MG/1
524 TABLET, CHEWABLE ORAL
Qty: 112 TABLET | Refills: 0 | Status: SHIPPED | OUTPATIENT
Start: 2022-02-07

## 2022-02-07 RX ORDER — TETRACYCLINE HYDROCHLORIDE 500 MG/1
500 CAPSULE ORAL 4 TIMES DAILY
Qty: 56 CAPSULE | Refills: 0 | Status: SHIPPED | OUTPATIENT
Start: 2022-02-07 | End: 2022-02-08 | Stop reason: ALTCHOICE

## 2022-02-07 RX ORDER — METRONIDAZOLE 250 MG/1
250 TABLET ORAL 4 TIMES DAILY
Qty: 56 TABLET | Refills: 0 | Status: SHIPPED | OUTPATIENT
Start: 2022-02-07 | End: 2022-02-21

## 2022-02-08 ENCOUNTER — OFFICE VISIT (OUTPATIENT)
Dept: SLEEP MEDICINE | Age: 37
End: 2022-02-08
Payer: MEDICARE

## 2022-02-08 VITALS
WEIGHT: 189 LBS | HEART RATE: 80 BPM | RESPIRATION RATE: 20 BRPM | SYSTOLIC BLOOD PRESSURE: 100 MMHG | TEMPERATURE: 97.5 F | DIASTOLIC BLOOD PRESSURE: 60 MMHG | OXYGEN SATURATION: 100 % | BODY MASS INDEX: 28.64 KG/M2 | HEIGHT: 68 IN

## 2022-02-08 DIAGNOSIS — R06.83 SNORING: ICD-10-CM

## 2022-02-08 DIAGNOSIS — G47.33 OBSTRUCTIVE SLEEP APNEA: Primary | ICD-10-CM

## 2022-02-08 DIAGNOSIS — E66.3 OVERWEIGHT (BMI 25.0-29.9): ICD-10-CM

## 2022-02-08 DIAGNOSIS — R06.89 GASPING FOR BREATH: ICD-10-CM

## 2022-02-08 PROCEDURE — G8427 DOCREV CUR MEDS BY ELIG CLIN: HCPCS | Performed by: PSYCHIATRY & NEUROLOGY

## 2022-02-08 PROCEDURE — 1036F TOBACCO NON-USER: CPT | Performed by: PSYCHIATRY & NEUROLOGY

## 2022-02-08 PROCEDURE — 99204 OFFICE O/P NEW MOD 45 MIN: CPT | Performed by: PSYCHIATRY & NEUROLOGY

## 2022-02-08 PROCEDURE — G8484 FLU IMMUNIZE NO ADMIN: HCPCS | Performed by: PSYCHIATRY & NEUROLOGY

## 2022-02-08 PROCEDURE — G8419 CALC BMI OUT NRM PARAM NOF/U: HCPCS | Performed by: PSYCHIATRY & NEUROLOGY

## 2022-02-08 ASSESSMENT — SLEEP AND FATIGUE QUESTIONNAIRES
ESS TOTAL SCORE: 2
HOW LIKELY ARE YOU TO NOD OFF OR FALL ASLEEP WHILE SITTING AND TALKING TO SOMEONE: 0
HOW LIKELY ARE YOU TO NOD OFF OR FALL ASLEEP IN A CAR, WHILE STOPPED FOR A FEW MINUTES IN TRAFFIC: 0
HOW LIKELY ARE YOU TO NOD OFF OR FALL ASLEEP WHEN YOU ARE A PASSENGER IN A CAR FOR AN HOUR WITHOUT A BREAK: 0
HOW LIKELY ARE YOU TO NOD OFF OR FALL ASLEEP WHILE SITTING AND READING: 0
HOW LIKELY ARE YOU TO NOD OFF OR FALL ASLEEP WHILE LYING DOWN TO REST IN THE AFTERNOON WHEN CIRCUMSTANCES PERMIT: 1
HOW LIKELY ARE YOU TO NOD OFF OR FALL ASLEEP WHILE SITTING INACTIVE IN A PUBLIC PLACE: 0
HOW LIKELY ARE YOU TO NOD OFF OR FALL ASLEEP WHILE WATCHING TV: 1
HOW LIKELY ARE YOU TO NOD OFF OR FALL ASLEEP WHILE SITTING QUIETLY AFTER LUNCH WITHOUT ALCOHOL: 0
NECK CIRCUMFERENCE (INCHES): 13.5

## 2022-02-08 ASSESSMENT — ENCOUNTER SYMPTOMS
CHOKING: 1
ALLERGIC/IMMUNOLOGIC NEGATIVE: 1
GASTROINTESTINAL NEGATIVE: 1
EYES NEGATIVE: 1

## 2022-02-08 NOTE — PATIENT INSTRUCTIONS
Orders Placed This Encounter   Procedures    Baseline Diagnostic Sleep Study     Standing Status:   Future     Standing Expiration Date:   2/8/2023     Order Specific Question:   Adult or Pediatric     Answer:   Adult Study (>7 Years)     Order Specific Question:   Location For Sleep Study     Answer:   [place]     Order Specific Question:   Select Sleep Lab Location     Answer:   [facility]    Sleep Study with PAP Titration     Standing Status:   Future     Standing Expiration Date:   2/8/2023     Order Specific Question:   Sleep Study Titration Type     Answer:   CPAP     Order Specific Question:   Location For Sleep Study     Answer:   [place]     Order Specific Question:   Select Sleep Lab Location     Answer:   [facility]        Patient Education        Sleep Apnea: Care Instructions  Overview     Sleep apnea means that you frequently stop breathing for 10 seconds or longer during sleep. It can be mild to severe, based on the number of times an hour that you stop breathing. Blocked or narrowed airways in your nose, mouth, or throat can cause sleep apnea. Your airway can become blocked when your throat muscles and tongue relax during sleep. You can help treat sleep apnea at home by making lifestyle changes. You also can use a CPAP breathing machine that keeps tissues in the throat from blocking your airway. Or your doctor may suggest that you use a breathing device while you sleep. It helps keep your airway open. This could be a device that you put in your mouth. In some cases, surgery may be needed to remove enlarged tissues in the throat. Follow-up care is a key part of your treatment and safety. Be sure to make and go to all appointments, and call your doctor if you are having problems. It's also a good idea to know your test results and keep a list of the medicines you take. How can you care for yourself at home? · Lose weight, if needed. · Sleep on your side.  It may help mild apnea.  · Avoid alcohol and medicines such as sleeping pills, opioids, or sedatives before bed. · Don't smoke. If you need help quitting, talk to your doctor. · Prop up the head of your bed. · Treat breathing problems, such as a stuffy nose, that are caused by a cold or allergies. · Try a continuous positive airway pressure (CPAP) breathing machine if your doctor recommends it. · If CPAP doesn't work for you, ask your doctor if you can try other masks, settings, or breathing machines. · Try oral breathing devices or other nasal devices. · Talk to your doctor if your nose feels dry or bleeds, or if it gets runny or stuffy when you use a breathing machine. · Tell your doctor if you're sleepy during the day and it affects your daily life. Don't drive or operate machinery when you're drowsy. When should you call for help? Watch closely for changes in your health, and be sure to contact your doctor if:    · You still have sleep apnea even though you have made lifestyle changes.     · You are thinking of trying a device such as CPAP.     · You are having problems using a CPAP or similar machine.     · You are still sleepy during the day, and it affects your daily life. Where can you learn more? Go to https://[employer]. org and sign in to your [employer] account. Enter U658 in the [facility] box to learn more about \"Sleep Apnea: Care Instructions. \"     If you do not have an account, please click on the \"Sign Up Now\" link. Current as of: July 6, 2021               Content Version: 13.1  © 1893-3966 Healthwise, Incorporated. Care instructions adapted under license by [facility] [email or web address] [facility] ([facility]). If you have questions about a medical condition or this instruction, always ask your healthcare professional. [place] [address] any warranty or liability for your use of this information.

## 2022-02-08 NOTE — PROGRESS NOTES
MD DARLYN Mejia Board Certified in Sleep Medicine  Certified [facility] Sleep Medicine  Board Certified in Neurology [address]  [address] [facility] [address] [place], [place] [address]  [address], [address]           [address]  [facility] SLEEP MEDICINE [address] [address] [phone]    Subjective:     Patient ID: Angeline Lee is a 39 y.o. female. Chief Complaint   Patient presents with    hospitals Care       HPI:        Angeline Lee is a 39 y.o. female referred by Dr. Macie Kim for a sleep evaluation. She complains of snoring, snorting, choking, tossing and turning, kicking, feels sleepy during the day, insomnia but she denies periods of not breathing, knees buckling with laughing, completely or partially paralyzed while falling asleep or waking up, noisy environment, uncomfortable room temperature, uncomfortable bedding. Symptoms began a few years ago, gradually worsening since that time. The patient's bed-partner confirmed the snoring without stopped breathing at night  SLEEP SCHEDULE: Goes to bed around 11 PM-1 AM in the weekdays and 11 PM-1 AM in the weekends. It usually takes the patient  minutes to fall asleep. The patient gets up 1 per night to go to the bathroom. The Patient finally gets up at 6:30 AM during the weekdays and 7 AM in the weekends. The patient has restless sleep with frequent arousals in addition to the Patient has significant daytime sleepiness. The Patient scored Total score: 2 on [place] Sleepiness Scale ( more than 10 is indicative of daytime sleepiness)and 14 in fatigue scale ( more than 36 is indicative of daytime fatigue). The patient takes no naps. Previous evaluation and treatment has included- none.   The Patient has been overweight for many years and tried, has gained file    Unstable Housing in the Last Year: Not on file       Prior to Admission medications    Medication Sig Start Date End Date Taking? Authorizing Provider   metroNIDAZOLE (FLAGYL) 250 MG tablet Take 1 tablet by mouth 4 times daily for 14 days 2/7/22 2/21/22 Yes Brenda Peetrson MD   bismuth subsalicylate (PEPTO BISMOL) 262 MG chewable tablet Take 2 tablets by mouth 4 times daily (before meals and nightly) 2/7/22  Yes Brenda Peterson MD   pantoprazole (PROTONIX) 40 MG tablet Take 1 tablet by mouth 2 times daily Then resume 20 mg Protonix daily thereafter 2/7/22  Yes Brenda Peterson MD   vitamin D (ERGOCALCIFEROL) 1.25 MG (97765 UT) CAPS capsule Take 1 capsule by mouth once a week 2/2/22  Yes Brenda Petreson MD   busPIRone (BUSPAR) 5 MG tablet Take 1 tablet by mouth 2 times daily 12/14/21  Yes Historical Provider, MD   meloxicam (MOBIC) 15 MG tablet Take 1 tablet by mouth daily as needed (headache) 1/28/22  Yes Brenda Peterson MD   ibuprofen (ADVIL;MOTRIN) 400 MG tablet Take 1 tablet by mouth every 6 hours as needed for Pain 11/20/20  Yes Amilcar Lin MD       Allergies as of 02/08/2022    (No Known Allergies)       Patient Active Problem List   Diagnosis    Closed displaced fracture of proximal phalanx of lesser toe of right foot       Past Medical History:   Diagnosis Date    COVID-19 11/20/2020       History reviewed. No pertinent surgical history. History reviewed. No pertinent family history. Review of Systems   Constitutional: Negative. HENT: Negative. Eyes: Negative. Respiratory: Positive for choking. Cardiovascular: Negative for leg swelling. Gastrointestinal: Negative. Endocrine: Negative. Genitourinary: Negative. Musculoskeletal: Negative. Allergic/Immunologic: Negative. Neurological: Negative. Hematological: Negative. Psychiatric/Behavioral: Negative. Negative for agitation and dysphoric mood. The patient is not nervous/anxious.         Objective: Vitals:  Weight BMI Neck circumference    Wt Readings from Last 3 Encounters:   02/08/22 189 lb (85.7 kg)   02/02/22 188 lb (85.3 kg)   01/28/22 188 lb (85.3 kg)    Body mass index is 28.74 kg/m². Neck circumference: 13.5     BP HR SaO2   BP Readings from Last 3 Encounters:   02/08/22 100/60   02/02/22 94/66   01/28/22 101/67    Pulse Readings from Last 3 Encounters:   02/08/22 80   02/02/22 87   01/28/22 70    SpO2 Readings from Last 3 Encounters:   02/08/22 100%   02/02/22 98%   01/28/22 99%        The mandibular molar Class :   [x]1 []2 []3      Mallampati I Airway Classification:   []1 []2 [x]3 []4        Physical Exam  Vitals and nursing note reviewed. Constitutional:       Appearance: Normal appearance. HENT:      Head: Atraumatic. Nose: Nose normal.      Mouth/Throat:      Comments: Mallampati class 3, no retrognathia or hypognathia , normal airflow in bilateral nostrils, no septum deviation , crowded oropharynx with low soft palate, no tonsils enlargement. Eyes:      Extraocular Movements: Extraocular movements intact. Cardiovascular:      Rate and Rhythm: Normal rate and regular rhythm. Heart sounds: Normal heart sounds. Pulmonary:      Effort: Pulmonary effort is normal.      Breath sounds: Normal breath sounds. Musculoskeletal:         General: Normal range of motion. Cervical back: Normal range of motion and neck supple. Skin:     General: Skin is warm. Neurological:      General: No focal deficit present. Psychiatric:         Mood and Affect: Mood normal.         Assessment:   Obstructive sleep apnea especially with snoring, snorting,  observed apnea, daytime sleepiness, large neck circumference, Mallampati class of 3 . Diagnosis Orders   1. Obstructive sleep apnea  Baseline Diagnostic Sleep Study    Sleep Study with PAP Titration   2. Overweight (BMI 25.0-29. 9)  Baseline Diagnostic Sleep Study   3. Snoring  Baseline Diagnostic Sleep Study   4.  Gasping for breath  Baseline Diagnostic Sleep Study     Plan:   Sleep compression 11 Pm till 6:30 AM.  Patient was counseled about the pathophysiology of obstructive sleep apnea syndrome and the methods for evaluating its presence and severity. Patient was counseled to avoid driving and other potentially hazardous circumstances if the patient is experiencing excessive sleepiness. Treatment considerations include the use of nasal CPAP, oral dental appliance or a surgical intervention, which should be based on otolarygologic findings, In the meantime, the patient should be cautioned to avoid the use of alcohol or other depressant medications because of potential for increasing the duration and severity of apnea and cautioned regarding driving or operating and dangerous equipment if the patient is experiencing daytime sleepiness. .  Weight loss. Orders Placed This Encounter   Procedures    Baseline Diagnostic Sleep Study    Sleep Study with PAP Titration       Return for F/U between 31 and 90 days from the recieving CPAP.     Josh Bruno MD  Medical Director [address] [place] [address]

## 2022-03-23 RX ORDER — PANTOPRAZOLE SODIUM 40 MG/1
TABLET, DELAYED RELEASE ORAL
Qty: 28 TABLET | Refills: 5 | Status: SHIPPED | OUTPATIENT
Start: 2022-03-23 | End: 2022-10-03

## 2022-06-13 RX ORDER — PANTOPRAZOLE SODIUM 20 MG/1
20 TABLET, DELAYED RELEASE ORAL DAILY
Qty: 30 TABLET | Refills: 3 | OUTPATIENT
Start: 2022-06-13

## 2022-06-13 NOTE — TELEPHONE ENCOUNTER
Medication:   Requested Prescriptions     Pending Prescriptions Disp Refills    pantoprazole (PROTONIX) 20 MG tablet [Pharmacy Med Name: PANTOPRAZOLE 20MG TABLETS] 30 tablet 3     Sig: TAKE 1 TABLET BY MOUTH DAILY        Last Filled:      Patient Phone Number: [phone] (home)     Last appt: 2/2/2022   Next appt: Visit date not found    Last OARRS: No flowsheet data found.

## 2022-10-03 RX ORDER — PANTOPRAZOLE SODIUM 40 MG/1
TABLET, DELAYED RELEASE ORAL
Qty: 28 TABLET | Refills: 5 | OUTPATIENT
Start: 2022-10-03

## 2022-10-03 RX ORDER — PANTOPRAZOLE SODIUM 20 MG/1
20 TABLET, DELAYED RELEASE ORAL
Qty: 90 TABLET | Refills: 1 | Status: SHIPPED | OUTPATIENT
Start: 2022-10-03

## 2022-10-03 NOTE — TELEPHONE ENCOUNTER
Future Appointments    This patient does not currently have any appointments scheduled.   Past Visits    Date Provider Specialty Visit Type Primary Dx   02/08/2022 Seven Valenzuela MD Sleep Medicine Office Visit Obstructive sleep apnea   02/02/2022 Pamela Ann MD Primary Care Office Visit Need for influenza vaccination   01/28/2022 Pamela Ann MD Primary Care Office Visit Nonintractable episodic headache, unspecified headache type